# Patient Record
Sex: FEMALE | URBAN - METROPOLITAN AREA
[De-identification: names, ages, dates, MRNs, and addresses within clinical notes are randomized per-mention and may not be internally consistent; named-entity substitution may affect disease eponyms.]

---

## 2019-10-31 ENCOUNTER — APPOINTMENT (RX ONLY)
Dept: URBAN - METROPOLITAN AREA CLINIC 53 | Facility: CLINIC | Age: 49
Setting detail: DERMATOLOGY
End: 2019-10-31

## 2019-10-31 DIAGNOSIS — L82.1 OTHER SEBORRHEIC KERATOSIS: ICD-10-CM

## 2019-10-31 DIAGNOSIS — L60.3 NAIL DYSTROPHY: ICD-10-CM

## 2019-10-31 DIAGNOSIS — L65.9 NONSCARRING HAIR LOSS, UNSPECIFIED: ICD-10-CM

## 2019-10-31 PROBLEM — D23.72 OTHER BENIGN NEOPLASM OF SKIN OF LEFT LOWER LIMB, INCLUDING HIP: Status: ACTIVE | Noted: 2019-10-31

## 2019-10-31 PROCEDURE — ? RECOMMENDATIONS

## 2019-10-31 PROCEDURE — ? COUNSELING

## 2019-10-31 PROCEDURE — 99202 OFFICE O/P NEW SF 15 MIN: CPT

## 2019-10-31 PROCEDURE — ? ORDER TESTS

## 2019-10-31 ASSESSMENT — LOCATION DETAILED DESCRIPTION DERM
LOCATION DETAILED: LEFT RIB CAGE
LOCATION DETAILED: RIGHT GREAT TOENAIL

## 2019-10-31 ASSESSMENT — LOCATION SIMPLE DESCRIPTION DERM
LOCATION SIMPLE: RIGHT GREAT TOE
LOCATION SIMPLE: ABDOMEN

## 2019-10-31 ASSESSMENT — LOCATION ZONE DERM
LOCATION ZONE: TOENAIL
LOCATION ZONE: TRUNK

## 2019-10-31 NOTE — PROCEDURE: RECOMMENDATIONS
Recommendations (Free Text): Pt going to gynecology to check hormone levels also. Pt may start hair product prescribed by [place] dermatologist. Recommendations (Free Text): Pt going to gynecology to check hormone levels also. Pt may start hair product prescribed by [place] dermatologist.